# Patient Record
Sex: MALE | Race: WHITE | NOT HISPANIC OR LATINO | Employment: OTHER | URBAN - METROPOLITAN AREA
[De-identification: names, ages, dates, MRNs, and addresses within clinical notes are randomized per-mention and may not be internally consistent; named-entity substitution may affect disease eponyms.]

---

## 2019-05-06 ENCOUNTER — HOSPITAL ENCOUNTER (EMERGENCY)
Facility: OTHER | Age: 79
Discharge: HOME | End: 2019-05-06
Attending: EMERGENCY MEDICINE
Payer: MEDICARE

## 2019-05-06 VITALS
TEMPERATURE: 99 F | SYSTOLIC BLOOD PRESSURE: 119 MMHG | RESPIRATION RATE: 18 BRPM | HEIGHT: 70 IN | HEART RATE: 80 BPM | OXYGEN SATURATION: 95 % | WEIGHT: 180 LBS | DIASTOLIC BLOOD PRESSURE: 73 MMHG | BODY MASS INDEX: 25.77 KG/M2

## 2019-05-06 DIAGNOSIS — R04.0 BLEEDING FROM THE NOSE: Primary | ICD-10-CM

## 2019-05-06 DIAGNOSIS — R05.9 COUGH: ICD-10-CM

## 2019-05-06 PROCEDURE — 99283 EMERGENCY DEPT VISIT LOW MDM: CPT

## 2019-05-06 PROCEDURE — 25000003 PHARM REV CODE 250: Performed by: EMERGENCY MEDICINE

## 2019-05-06 RX ORDER — LIDOCAINE HYDROCHLORIDE 40 MG/ML
SOLUTION TOPICAL ONCE
Status: COMPLETED | OUTPATIENT
Start: 2019-05-06 | End: 2019-05-06

## 2019-05-06 RX ORDER — VERAPAMIL HYDROCHLORIDE 240 MG/1
240 TABLET, FILM COATED, EXTENDED RELEASE ORAL NIGHTLY
COMMUNITY

## 2019-05-06 RX ORDER — LISINOPRIL 10 MG/1
10 TABLET ORAL DAILY
COMMUNITY

## 2019-05-06 RX ORDER — SILVER NITRATE 38.21; 12.74 MG/1; MG/1
1 STICK TOPICAL
Status: COMPLETED | OUTPATIENT
Start: 2019-05-06 | End: 2019-05-06

## 2019-05-06 RX ORDER — BENZONATATE 100 MG/1
100 CAPSULE ORAL
Status: COMPLETED | OUTPATIENT
Start: 2019-05-06 | End: 2019-05-06

## 2019-05-06 RX ORDER — TERAZOSIN 2 MG/1
6 CAPSULE ORAL NIGHTLY
COMMUNITY

## 2019-05-06 RX ORDER — BENZONATATE 100 MG/1
100 CAPSULE ORAL 3 TIMES DAILY PRN
Qty: 20 CAPSULE | Refills: 0 | Status: SHIPPED | OUTPATIENT
Start: 2019-05-06 | End: 2019-05-16

## 2019-05-06 RX ADMIN — THROMBIN, TOPICAL (BOVINE) 20000 UNITS: KIT at 10:05

## 2019-05-06 RX ADMIN — LIDOCAINE HYDROCHLORIDE: 40 SOLUTION TOPICAL at 09:05

## 2019-05-06 RX ADMIN — SILVER NITRATE APPLICATORS 1 APPLICATOR: 25; 75 STICK TOPICAL at 08:05

## 2019-05-06 RX ADMIN — BENZONATATE 100 MG: 100 CAPSULE ORAL at 10:05

## 2019-05-07 NOTE — ED NOTES
Pt sitting comfortably in bed with wife at bedside. Pt denies any needs at this time. NAD noted. Resp even and unlabored. Epistaxis has resolved. Side rails up x 2. Call bell within reach. Will continue to monitor.

## 2019-05-07 NOTE — ED TRIAGE NOTES
"Pt with c/o nose bleed that has been ongoing since 1400. Pt admits hx of nosebleeds and states they are usually treated with "plugs". Pt also states him and his wife were recently dx with pneumonia. NAD noted. Resp even and unlabored.   "

## 2019-05-07 NOTE — ED PROVIDER NOTES
Encounter Date: 5/6/2019    SCRIBE #1 NOTE: I, Mk Newby, am scribing for, and in the presence of, Dr. Hammond.       History     Chief Complaint   Patient presents with    Epistaxis     Nosebleed since 1400 today. Pt has a hx of nosebleeds. He is not on blood thinners.      Time seen by provider: 8:13 PM    This is a 78 y.o. male who presents with complaint of bilateral epistaxis that began about six hours ago. He denies trauma or injury to the nose, but reports he has been blowing his nose a lot due to a cold that has persisted for about three or four weeks and consists of cough, rhinorrhea, and congestion. The patient reports he has been using a plug for his nose, pressure, and ice but the epistaxis has not alleviated. The patient reports the plug that is currently in his right nostril has been in there for four or five hours. He reports he often gets nose bleeds, but none have ever lasted this long. He denies taking prescribed blood thinners, but reports taking occasional aspirin.    The patient also presents with productive cough that has persisted for three or four weeks. The patient reports with wife who has also been having the same cold symptoms. The patient reports cough is worsened at night. He reports taking ten day course of tessalon pearls, but they did not work.    The history is provided by the patient.     Review of patient's allergies indicates:  No Known Allergies  Past Medical History:   Diagnosis Date    Diabetes mellitus     Enlarged prostate     Hypertension      No past surgical history on file.  No family history on file.  Social History     Tobacco Use    Smoking status: Not on file   Substance Use Topics    Alcohol use: Not on file    Drug use: Not on file     Review of Systems   Constitutional: Negative for fever.   HENT: Positive for congestion, nosebleeds and rhinorrhea. Negative for sore throat.    Respiratory: Positive for cough. Negative for shortness of breath.     Cardiovascular: Negative for chest pain.   Gastrointestinal: Negative for nausea.   Genitourinary: Negative for dysuria.   Musculoskeletal: Negative for back pain.   Skin: Negative for rash.   Neurological: Negative for weakness.   Hematological: Does not bruise/bleed easily.       Physical Exam     Initial Vitals [05/06/19 1841]   BP Pulse Resp Temp SpO2   135/71 77 18 98.5 °F (36.9 °C) 96 %      MAP       --         Physical Exam    Nursing note and vitals reviewed.  Constitutional: He appears well-developed and well-nourished. He is not diaphoretic. No distress.   HENT:   Head: Normocephalic and atraumatic.   No blood in the posterior of the left nostril. Right nostril has active bleeding to the edge of the nostril.   Eyes: Conjunctivae and EOM are normal.   Neck: Normal range of motion. Neck supple.   Cardiovascular: Normal rate, regular rhythm and normal heart sounds. Exam reveals no gallop and no friction rub.    No murmur heard.  Pulmonary/Chest: Breath sounds normal. No respiratory distress. He has no wheezes. He has no rhonchi. He has no rales.   Musculoskeletal: Normal range of motion. He exhibits no edema or tenderness.   Lymphadenopathy:     He has no cervical adenopathy.   Neurological: He is alert and oriented to person, place, and time.   Skin: Skin is warm and dry. No rash noted. No erythema. No pallor.   Psychiatric: He has a normal mood and affect. His behavior is normal. Judgment and thought content normal.         ED Course   Procedures  Labs Reviewed - No data to display       Imaging Results    None             Additional MDM:   Comments: 78-year-old male presented with complaint of epistaxis intermittently throughout today.    On exam bleeding is actually external at the edge of the right nostril.  There is no active bleeding inside the nose. Initially pressure was applied with no change.  Subsequently, I did attempt to cauterize with silver nitrate.  Initially it seemed to not stop the  bleeding, however, when I returned with thrombin the bleeding appeared to have stopped.  Regardless, thrombin still applied and it will be reassessed.    Patient was given [place] so that he could self catheterize as he normally does at home.  He was also offered the option of obtaining a chest x-ray since he reports having a nonproductive cough for approximately 2 weeks.  Patient however was only interested and addressing his bleeding.    10:09 PM  Bleeding has not recurred.  Will discharge patient at this time in stable condition. I did initially write a prescription for Tessalon Perles, however, patient states he is already taking this and Ceftin.          Scribe Attestation:   Scribe #1: I performed the above scribed service and the documentation accurately describes the services I performed. I attest to the accuracy of the note.    Attending Attestation:           Physician Attestation for Scribe:  Physician Attestation Statement for Scribe #1: I, Dr. Hammond, reviewed documentation, as scribed by Mk Newby in my presence, and it is both accurate and complete.                    Clinical Impression:     1. Bleeding from the nose    2. Cough                                   Alicia Hammond MD  05/06/19 [id number]